# Patient Record
Sex: MALE | Race: WHITE | URBAN - METROPOLITAN AREA
[De-identification: names, ages, dates, MRNs, and addresses within clinical notes are randomized per-mention and may not be internally consistent; named-entity substitution may affect disease eponyms.]

---

## 2018-09-11 ENCOUNTER — THERAPY VISIT (OUTPATIENT)
Dept: PHYSICAL THERAPY | Facility: CLINIC | Age: 38
End: 2018-09-11
Payer: COMMERCIAL

## 2018-09-11 DIAGNOSIS — M25.572 PAIN IN JOINT INVOLVING ANKLE AND FOOT, LEFT: Primary | ICD-10-CM

## 2018-09-11 PROCEDURE — 97110 THERAPEUTIC EXERCISES: CPT | Mod: GP | Performed by: PHYSICAL THERAPIST

## 2018-09-11 PROCEDURE — 97161 PT EVAL LOW COMPLEX 20 MIN: CPT | Mod: GP | Performed by: PHYSICAL THERAPIST

## 2018-09-11 NOTE — PROGRESS NOTES
[facility] Initial Evaluation  Subjective:  Patient is a 38 year old male presenting with rehab left ankle/foot hpi. The history is provided by the patient.   Radames Lewis is a 38 year old male with a left foot (left navicular) condition.      This is a new condition  Patient has chief complaint of left foot pain with weight bearing and ankle weakness and stiffness s/p navicular ORIF on 6/29/2018. He injured foot while running 2 years ago and had surgery after trying to rest it with CAM boot without relief. Last Thursday he was given MD approval to walk and exercise with no restrictions. He has been using a rolling walker, but also has crutches at home. He gets lateral ankle and foot pain with weight bearing, but navicular area is pain free..    Patient reports pain:  Lateral (left lateral heel and ankle pain).  Radiates to:  Lower leg.  Pain is described as sharp and is intermittent and reported as 5/10.   Pain is the same all the time.  Symptoms are exacerbated by weight bearing, descending stairs, walking, ascending stairs and standing and relieved by rest.  Since onset symptoms are gradually improving.        General health as reported by patient is excellent.                                              Objective:    Gait:    Gait Type:  Antalgic   Weight Bearing Status:  WBAT   Assistive Devices:  Crutches            Ankle/Foot Evaluation  ROM:    AROM:    Dorsiflexion: Left:   3  Right:    Plantarflexion: Left:  35    Right:   Inversion: Left:  30     Right:   Eversion: 20     Right:   Great toe flexion: Left:  Stiff     Right:   Great Toe Extension: Left:  Stiff     Right:  PROM:    Dorsiflexion: Left:    6     Right:     Plantarflexion: Left:    50     Right:               Strength:    Dorsiflexion:  Left: 4/5     Pain:     Plantarflexion: Left: 2+/5   Pain:     Inversion:Left: 4-/5  Pain:       Eversion:Left: 4-/5  Pain:                    LIGAMENT TESTING: not  assessed                PALPATION:   Left ankle tenderness present at:  deltoid ligament and plantar fascia  Left ankle tenderness not present at:   achilles tendon or navicular    EDEMA:   Left ankle edema present at: general          MOBILITY TESTING:       Talocrural Left: hypomobile      Subtalar Left: hypomobile      Midtarsal Left: hypomobile      First Ray Left: hypomobile                                                        General     ROS    Assessment/Plan:    Patient is a 38 year old male with left foot complaints.    Patient has the following significant findings with corresponding treatment plan.                Diagnosis 1:  Left navicular ORIF  Pain -  hot/cold therapy and self management  Decreased ROM/flexibility - manual therapy, therapeutic exercise and home program  Decreased strength - therapeutic exercise, therapeutic activities and home program  Impaired gait - gait training    Therapy Evaluation Codes:   1) History comprised of:   Personal factors that impact the plan of care:      None.    Comorbidity factors that impact the plan of care are:      None.     Medications impacting care: None.  2) Examination of Body Systems comprised of:   Body structures and functions that impact the plan of care:      left foot.   Activity limitations that impact the plan of care are:      Sports, Stairs, Standing and Walking.  3) Clinical presentation characteristics are:   Stable/Uncomplicated.  4) Decision-Making    Low complexity using standardized patient assessment instrument and/or measureable assessment of functional outcome.  Cumulative Therapy Evaluation is: Low complexity.    Previous and current functional limitations:  (See Goal Flow Sheet for this information)    Short term and Long term goals: (See Goal Flow Sheet for this information)     Communication ability:  Patient appears to be able to clearly communicate and understand verbal and written communication and follow directions  correctly.  Treatment Explanation - The following has been discussed with the patient:   RX ordered/plan of care  Anticipated outcomes  Possible risks and side effects  This patient would benefit from PT intervention to resume normal activities.   Rehab potential is excellent.    Frequency:  1 X week, once daily  Duration:  for 6 weeks  Discharge Plan:  Achieve all LTG.  Independent in home treatment program.  Reach maximal therapeutic benefit.    Please refer to the daily flowsheet for treatment today, total treatment time and time spent performing 1:1 timed codes.

## 2018-09-11 NOTE — PROGRESS NOTES
[facility] Initial Evaluation  Subjective:  Patient is a 38 year old male presenting with rehab left ankle/foot hpi.                                          Other surgeries include:  Orthopedic surgery (foot/shoulder).    Current occupation is IT Analist.    Primary job tasks include:  Prolonged sitting (computer work).                                Objective:  System    Physical Exam    General     ROS    Assessment/Plan:

## 2018-09-11 NOTE — LETTER
[facility]  [address]  [facility] [address]  [phone]    2018    Re: Radames Lewis   :   1980  MRN:  [id number]   REFERRING PHYSICIAN:   Enzo Silverman    [facility] [place]    Date of Initial Evaluation:    Visits:  Rxs Used: 1  Reason for Referral:  Pain in joint involving ankle and foot, left    EVALUATION SUMMARY    [facility] Initial Evaluation  Subjective:  Patient is a 38 year old male presenting with rehab left ankle/foot hpi. The history is provided by the patient.   Radames Leiws is a 38 year old male with a left foot (left navicular) condition.      This is a new condition  Patient has chief complaint of left foot pain with weight bearing and ankle weakness and stiffness s/p navicular ORIF on 2018. He injured foot while running 2 years ago and had surgery after trying to rest it with CAM boot without relief. Last Thursday he was given MD approval to walk and exercise with no restrictions. He has been using a rolling walker, but also has crutches at home. He gets lateral ankle and foot pain with weight bearing, but navicular area is pain free..    Patient reports pain:  Lateral (left lateral heel and ankle pain).  Radiates to:  Lower leg.  Pain is described as sharp and is intermittent and reported as 5/10.   Pain is the same all the time.  Symptoms are exacerbated by weight bearing, descending stairs, walking, ascending stairs and standing and relieved by rest.  Since onset symptoms are gradually improving.        General health as reported by patient is excellent.                Other surgeries include:  Orthopedic surgery (foot/shoulder).    Current occupation is IT Analist.    Primary job tasks include:  Prolonged sitting (computer work).  Objective:  Gait:    Gait Type:  Antalgic   Weight Bearing Status:  WBAT   Assistive Devices:  Crutches  Ankle/Foot Evaluation  ROM:     AROM:    Dorsiflexion: Left:   3  Right:    Plantarflexion: Left:  35    Right:   Inversion: Left:  30     Right:   Eversion: 20     Right:   Great toe flexion: Left:  Stiff     Right:   Great Toe Extension: Left:  Stiff     Right:  PROM:    Dorsiflexion: Left:    6     Right:     Plantarflexion: Left:    50     Right:       Re: Radames Lewis   :   1980    Strength:    Dorsiflexion:  Left: 4/5     Pain:     Plantarflexion: Left: 2+/5   Pain:     Inversion:Left: 4-/5  Pain:       Eversion:Left: 4-/5  Pain:    LIGAMENT TESTING: not assessed  PALPATION:   Left ankle tenderness present at:  deltoid ligament and plantar fascia  Left ankle tenderness not present at:   achilles tendon or navicular  EDEMA:   Left ankle edema present at: general  MOBILITY TESTING:   Talocrural Left: hypomobile      Subtalar Left: hypomobile      Midtarsal Left: hypomobile      First Ray Left: hypomobile      Assessment/Plan:    Patient is a 38 year old male with left foot complaints.    Patient has the following significant findings with corresponding treatment plan.                Diagnosis 1:  Left navicular ORIF  Pain -  hot/cold therapy and self management  Decreased ROM/flexibility - manual therapy, therapeutic exercise and home program  Decreased strength - therapeutic exercise, therapeutic activities and home program  Impaired gait - gait training    Therapy Evaluation Codes:   1) History comprised of:   Personal factors that impact the plan of care:      None.    Comorbidity factors that impact the plan of care are:      None.     Medications impacting care: None.  2) Examination of Body Systems comprised of:   Body structures and functions that impact the plan of care:      left foot.   Activity limitations that impact the plan of care are:      Sports, Stairs, Standing and Walking.  3) Clinical presentation characteristics are:   Stable/Uncomplicated.  4) Decision-Making    Low complexity using standardized patient  assessment instrument and/or measureable assessment of functional outcome.  Cumulative Therapy Evaluation is: Low complexity.    Previous and current functional limitations:  (See Goal Flow Sheet for this information)    Short term and Long term goals: (See Goal Flow Sheet for this information)     Communication ability:  Patient appears to be able to clearly communicate and understand verbal and written communication and follow directions correctly.  Treatment Explanation - The following has been discussed with the patient:   RX ordered/plan of care  Re: Radames Lewis   :   1980          Anticipated outcomes  Possible risks and side effects  This patient would benefit from PT intervention to resume normal activities.   Rehab potential is excellent.    Frequency:  1 X week, once daily  Duration:  for 6 weeks  Discharge Plan:  Achieve all LTG.  Independent in home treatment program.  Reach maximal therapeutic benefit.      Thank you for your referral.    INQUIRIES  Therapist:  Ivett Arroyo, PT  [facility] [place]  [address] [facility] [address]  Phone: [phone]  Fax: [phone]

## 2018-09-12 PROBLEM — M25.572 PAIN IN JOINT INVOLVING ANKLE AND FOOT, LEFT: Status: ACTIVE | Noted: 2018-09-12

## 2018-10-11 PROBLEM — M25.572 PAIN IN JOINT INVOLVING ANKLE AND FOOT, LEFT: Status: RESOLVED | Noted: 2018-09-12 | Resolved: 2018-10-11

## 2018-12-04 ENCOUNTER — THERAPY VISIT (OUTPATIENT)
Dept: PHYSICAL THERAPY | Facility: CLINIC | Age: 38
End: 2018-12-04
Payer: COMMERCIAL

## 2018-12-04 DIAGNOSIS — M25.511 RIGHT SHOULDER PAIN: Primary | ICD-10-CM

## 2018-12-04 PROCEDURE — 97161 PT EVAL LOW COMPLEX 20 MIN: CPT | Mod: GP | Performed by: PHYSICAL THERAPIST

## 2018-12-04 PROCEDURE — 97110 THERAPEUTIC EXERCISES: CPT | Mod: GP | Performed by: PHYSICAL THERAPIST

## 2018-12-04 NOTE — PROGRESS NOTES
[facility] Initial Evaluation  Subjective:  Patient is a 38 year old male presenting with rehab right shoulder hpi. The history is provided by the patient.   Radames Lewis is a 38 year old male with a right shoulder condition.        Patient underwent right shoulder arthroscopic bicep tenodesis with debridement on 11/27/2018. Pain level is 4/10 and 010 at rest. He states that his shoulder has bothered him for many years, but went to see his MD 3-4 months ago due to gradual increase in pain. He had increased pain with reaching overhead, external rotation, lying on it, and throwing. No complaints of instability and minimal weakness prior to surgery. History of two labral surgeries 5 & 10 years ago, with minimal relief of shoulder pain. Patient is right handed.    Patient reports pain:  Anterior, in the joint and upper arm.    Pain is described as aching and sharp and is intermittent and reported as 4/10.  Associated symptoms:  Loss of motion/stiffness. Pain is the same all the time.  Symptoms are exacerbated by certain positions, lifting, lying on extremity, using arm overhead and using arm at shoulder level and relieved by rest and ice.  Since onset symptoms are gradually improving.        General health as reported by patient is good.                                              Objective:  System                   Shoulder Evaluation:  ROM:  AROM:  not assessed                            PROM:    Flexion:  Right: 80    Extension:  Right:  25  Abduction:  Right:  60    Internal Rotation:  Right:  65  External Rotation:  Right:  25      Elbow Flexion:  Right:  WNL  Elbow Extension:  Right:  WNL            Strength:  not assessed                                 ROM:    PROM:          Flexion Wrist:  Right:  WNL  Extension Wrist:  Right: 50  Supination Elbow/Wrist: Right: 60  Pronation Elbow/Wrist: Right: WNL  Radial Deviation:  Right: WNL  Ulnar Deviation: Right: WNL                                             General     ROS    Assessment/Plan:    Patient is a 38 year old male with right side shoulder complaints.    Patient has the following significant findings with corresponding treatment plan.                Diagnosis 1:  Right shoulder biceps tenodesis  Pain -  hot/cold therapy, manual therapy and self management  Decreased ROM/flexibility - manual therapy, therapeutic exercise and home program  Decreased strength - therapeutic exercise, therapeutic activities and home program    Therapy Evaluation Codes:   1) History comprised of:   Personal factors that impact the plan of care:      None.    Comorbidity factors that impact the plan of care are:      None.     Medications impacting care: None.  2) Examination of Body Systems comprised of:   Body structures and functions that impact the plan of care:      Shoulder.   Activity limitations that impact the plan of care are:      Bathing, Dressing and Lifting.  3) Clinical presentation characteristics are:   Stable/Uncomplicated.  4) Decision-Making    Low complexity using standardized patient assessment instrument and/or measureable assessment of functional outcome.  Cumulative Therapy Evaluation is: Low complexity.    Previous and current functional limitations:  (See Goal Flow Sheet for this information)    Short term and Long term goals: (See Goal Flow Sheet for this information)     Communication ability:  Patient appears to be able to clearly communicate and understand verbal and written communication and follow directions correctly.  Treatment Explanation - The following has been discussed with the patient:   RX ordered/plan of care  Anticipated outcomes  Possible risks and side effects  This patient would benefit from PT intervention to resume normal activities.   Rehab potential is excellent.    Frequency:  2 X week, once daily  Duration:  for 4 weeks  Discharge Plan:  Achieve all LTG.  Independent in home treatment program.  Reach maximal  therapeutic benefit.    Please refer to the daily flowsheet for treatment today, total treatment time and time spent performing 1:1 timed codes.

## 2018-12-04 NOTE — LETTER
[facility]  [address]  [facility] [address]  [phone]    2018    Re: Radames Lewis   :   1980  MRN:  [id number]   REFERRING PHYSICIAN:   Zhang Moore    [facility]  Date of Initial Evaluation:  18  Visits:  Rxs Used: 1  Reason for Referral:  Right shoulder pain    EVALUATION SUMMARY  [facility] Initial Evaluation  Subjective:  Patient is a 38 year old male presenting with rehab right shoulder hpi. The history is provided by the patient. Radames Lewis is a 38 year old male with a right shoulder condition.  Patient underwent right shoulder arthroscopic bicep tenodesis with debridement on 2018. Pain level is 4/10 and 010 at rest. He states that his shoulder has bothered him for many years, but went to see his MD 3-4 months ago due to gradual increase in pain. He had increased pain with reaching overhead, external rotation, lying on it, and throwing. No complaints of instability and minimal weakness prior to surgery. History of two labral surgeries 5 & 10 years ago, with minimal relief of shoulder pain. Patient is right handed.  Patient reports pain:  Anterior, in the joint and upper arm.    Pain is described as aching and sharp and is intermittent and reported as 4/10.  Associated symptoms:  Loss of motion/stiffness. Pain is the same all the time.  Symptoms are exacerbated by certain positions, lifting, lying on extremity, using arm overhead and using arm at shoulder level and relieved by rest and ice.  Since onset symptoms are gradually improving.        General health as reported by patient is good.                     Shoulder Evaluation:  ROM:  AROM:  not assessed  PROM:    Flexion:  Right: 80    Extension:  Right:  25  Abduction:  Right:  60  Internal Rotation:  Right:  65  External Rotation:  Right:  25  Elbow Flexion:  Right:  WNL  Elbow Extension:  Right:  WNL  Strength:  not  assessed  ROM:  PROM:    Flexion Wrist:  Right:  WNL  Extension Wrist:  Right: 50  Supination Elbow/Wrist: Right: 60  Pronation Elbow/Wrist: Right: WNL  Radial Deviation:  Right: WNL  Ulnar Deviation: Right: WNL          Re: Radames Lewis   :   1980    Assessment/Plan:    Patient is a 38 year old male with right side shoulder complaints.    Patient has the following significant findings with corresponding treatment plan.                Diagnosis 1:  Right shoulder biceps tenodesis  Pain -  hot/cold therapy, manual therapy and self management  Decreased ROM/flexibility - manual therapy, therapeutic exercise and home program  Decreased strength - therapeutic exercise, therapeutic activities and home program    Therapy Evaluation Codes:   1) History comprised of:   Personal factors that impact the plan of care:      None.    Comorbidity factors that impact the plan of care are:      None.     Medications impacting care: None.  2) Examination of Body Systems comprised of:   Body structures and functions that impact the plan of care:      Shoulder.   Activity limitations that impact the plan of care are:      Bathing, Dressing and Lifting.  3) Clinical presentation characteristics are:   Stable/Uncomplicated.  4) Decision-Making    Low complexity using standardized patient assessment instrument and/or measureable assessment of functional outcome.  Cumulative Therapy Evaluation is: Low complexity.    Previous and current functional limitations:  (See Goal Flow Sheet for this information)    Short term and Long term goals: (See Goal Flow Sheet for this information)     Communication ability:  Patient appears to be able to clearly communicate and understand verbal and written communication and follow directions correctly.  Treatment Explanation - The following has been discussed with the patient:   RX ordered/plan of care  Anticipated outcomes  Possible risks and side effects  This patient would benefit from PT  intervention to resume normal activities.   Rehab potential is excellent.    Frequency:  2 X week, once daily  Duration:  for 4 weeks  Discharge Plan:  Achieve all LTG.  Independent in home treatment program.  Reach maximal therapeutic benefit.    Thank you for your referral.    INQUIRIES  Therapist: Sandrine Arroyo [facility] [place]  [address] [facility] [address]  Phone: [phone]  Fax: [phone]

## 2018-12-07 ENCOUNTER — THERAPY VISIT (OUTPATIENT)
Dept: PHYSICAL THERAPY | Facility: CLINIC | Age: 38
End: 2018-12-07
Payer: COMMERCIAL

## 2018-12-07 DIAGNOSIS — M25.511 RIGHT SHOULDER PAIN: ICD-10-CM

## 2018-12-07 PROCEDURE — 97010 HOT OR COLD PACKS THERAPY: CPT | Mod: GP

## 2018-12-07 PROCEDURE — 97110 THERAPEUTIC EXERCISES: CPT | Mod: GP

## 2018-12-18 ENCOUNTER — THERAPY VISIT (OUTPATIENT)
Dept: PHYSICAL THERAPY | Facility: CLINIC | Age: 38
End: 2018-12-18
Payer: COMMERCIAL

## 2018-12-18 DIAGNOSIS — M25.511 RIGHT SHOULDER PAIN: ICD-10-CM

## 2018-12-18 PROCEDURE — 97110 THERAPEUTIC EXERCISES: CPT | Mod: GP | Performed by: PHYSICAL THERAPIST

## 2018-12-18 NOTE — LETTER
[facility]  [address] [facility] [id number] [phone]    2019    Re: Radames Lewis   :   1980  MRN:  [id number]   REFERRING PHYSICIAN:   Zhang Moore    [facility]  Date of Initial Evaluation:  2018  Visits:  Rxs Used: 3  Reason for Referral:  Right shoulder pain     DISCHARGE REPORT  Progress reporting period is from 2018 to 2018.   SUBJECTIVE  Subjective: Patient reports shoulder is doing well and is no longer wearing sling. He finds that he has to constantly be aware not to use it too much.   Current Pain level: 2/10   Initial Pain level: 10   Changes in function: Yes, see goal flow sheet for change in function   Adverse reactions: None    OBJECTIVE  Objective: AAROM: IR=75; ER=50;[id number]. Elbow ROM is full and pain free.     ASSESSMENT/PLAN  STG/LTGs have been met or progress has been made towards goals:  Yes (See Goal flow sheet completed today.)  Assessment of Progress: The patient's condition is improving.  The patient's condition has potential to improve.  Self Management Plans:  Patient is independent in a home treatment program.  Patient is independent in self management of symptoms.  Radames continues to require the following intervention to meet STG and LTG's: PT intervention is no longer required to meet STG/LTG.    Recommendations:  This patient is ready to be discharged from therapy and continue their home treatment program.    Thank you for your referral.    INQUIRIES  Therapist: Ivett Arroyo, PT  [facility]  [address] [address]  Phone: [phone]  Fax: [phone]

## 2019-03-19 PROBLEM — M25.511 RIGHT SHOULDER PAIN: Status: RESOLVED | Noted: 2018-12-04 | Resolved: 2019-03-19

## 2019-03-19 NOTE — PROGRESS NOTES
Subjective:  HPI                    Objective:  System    Physical Exam    General     ROS    Assessment/Plan:    DISCHARGE REPORT    Progress reporting period is from 12/4/2018 to 12/18/2018.     SUBJECTIVE    Subjective: Patient reports shoulder is doing well and is no longer wearing sling. He finds that he has to constantly be aware not to use it too much.   Current Pain level: 2/10   Initial Pain level: 4/10   Changes in function: Yes, see goal flow sheet for change in function   Adverse reactions: None    OBJECTIVE    Objective: AAROM: IR=75; ER=50;[id number]. Elbow ROM is full and pain free.      ASSESSMENT/PLAN    STG/LTGs have been met or progress has been made towards goals:  Yes (See Goal flow sheet completed today.)  Assessment of Progress: The patient's condition is improving.  The patient's condition has potential to improve.  Self Management Plans:  Patient is independent in a home treatment program.  Patient is independent in self management of symptoms.    Radames continues to require the following intervention to meet STG and LTG's: PT intervention is no longer required to meet STG/LTG.    Recommendations:    This patient is ready to be discharged from therapy and continue their home treatment program.    Please refer to the daily flowsheet for treatment today, total treatment time and time spent performing 1:1 timed codes.

## 2022-06-14 ENCOUNTER — NURSE TRIAGE (OUTPATIENT)
Dept: NURSING | Facility: CLINIC | Age: 42
End: 2022-06-14
Payer: COMMERCIAL

## 2022-06-14 NOTE — TELEPHONE ENCOUNTER
Pt calling wanting a provider to send an RX for cough with codeine into pharmacy for him.    Pt states he just came home from traveling to [place] for work and he doesn't feel like going out to an [facility] tonight.    Pt reports that he has a dry cough but worst symptom is sore throat that feels like razor blades.  Voice sounds different to him    Pt Denies;  Fever  Difficulty breathing/shortness of breath  Unable to open his mouth completely    According to the protocol, patient should see a physician or HCP within 24 hours.  Care advice given. Patient verbalizes understanding and agrees with plan of care. Transferred to scheduling. Patient disconnected the call during transfer.    Nicole Dugan RN, Nurse Advisor 6:17 PM 6/14/2022  COVID 19 Nurse Triage Plan/Patient Instructions    Please be aware that novel coronavirus (COVID-19) may be circulating in the community. If you develop symptoms such as fever, cough, or SOB or if you have concerns about the presence of another infection including coronavirus (COVID-19), please contact your health care provider or visit [email or web address].     Disposition/Instructions    In-Person Visit with provider recommended. Reference Visit Selection Guide.    Thank you for taking steps to prevent the spread of this virus.  o Limit your contact with others.  o Wear a simple mask to cover your cough.  o Wash your hands well and often.    Reason for Disposition    SEVERE (e.g., excruciating) throat pain    Additional Information    Negative: Severe difficulty breathing (e.g., struggling for each breath, speaks in single words, stridor)    Negative: Sounds like a life-threatening emergency to the triager    Negative: [1] Diagnosed strep throat AND [2] taking antibiotic AND [3] symptoms continue    Negative: Throat culture results, call about    Negative: Productive cough is main symptom    Negative: Non-productive cough is main symptom    Negative: Hoarseness is main symptom     Negative: Runny nose is main symptom    Negative: [1] Drooling or spitting out saliva (because can't swallow) AND [2] normal breathing    Negative: Unable to open mouth completely    Negative: [1] Difficulty breathing AND [2] not severe    Negative: Fever > 104 F (40 C)    Negative: [1] Refuses to drink anything AND [2] for > 12 hours    Negative: [1] Drinking very little AND [2] dehydration suspected (e.g., no urine > 12 hours, very dry mouth, very lightheaded)    Negative: Patient sounds very sick or weak to the triager    Protocols used: SORE THROAT-A-AH

## 2025-03-20 ENCOUNTER — OFFICE VISIT (OUTPATIENT)
Dept: AUDIOLOGY | Facility: CLINIC | Age: 45
End: 2025-03-20
Payer: COMMERCIAL

## 2025-03-20 DIAGNOSIS — H93.12 TINNITUS OF LEFT EAR: ICD-10-CM

## 2025-03-20 DIAGNOSIS — H90.42 SENSORINEURAL HEARING LOSS (SNHL) OF LEFT EAR WITH UNRESTRICTED HEARING OF RIGHT EAR: Primary | ICD-10-CM

## 2025-03-20 NOTE — TELEPHONE ENCOUNTER
REFERRAL INFORMATION:  Referring By: Lu Kenyon AuD   Referring Clinic: [facility]   Reason for Visit/Diagnosis:  ENT for unilateral hearing loss and tinnitus    FUTURE VISIT INFORMATION:  Appointment Date: 4/21/25  Appointment Time: 1:50PM    NOTES STATUS DETAILS   OFFICE NOTE from referring provider Internal  3/20/25- OV wLu Salinas AuD @ [facility]

## 2025-03-20 NOTE — PROGRESS NOTES
"AUDIOLOGY REPORT    SUBJECTIVE:  Radames Lewis is a 44 year old male who was seen in the Audiology Clinic at the [facility] for audiologic evaluation. The patient reports a significant noise exposure event while hunting when he was 14 years old, he notes that following the initial noise exposure he had significant left tinnitus and decreased hearing which seems to have recovered. He reports that he generally has constant low level tinnitus that is not bothersome but that he will always experience loud tinnitus after being in any type of noisy environment. He reports that he has also begun experiencing a \"throbbing pain\" in the ears along with the tinnitus following being in noise. He denies any ear surgeries. The patient denies  bilateral drainage, bilateral aural fullness, and dizziness.  The patient notes difficulty with communication in a variety of listening situations, mostly complex environments. He also reports that he feels he speaks loudly/has difficulty regulating the volume of his voice.    OBJECTIVE:    Fall Risk Screen:  1. Have you fallen two or more times in the past year? No  2. Have you fallen and had an injury in the past year? No    Timed Up and Go Score (in seconds): not tested  Is patient a fall risk? No  Referral initiated: No  Fall Risk Assessment Completed by Audiology    Otoscopic exam indicates ears are clear of cerumen bilaterally     Pure Tone Thresholds assessed using conventional audiometry with good  reliability from 250-8000 Hz bilaterally using insert earphones and circumaural headphones     RIGHT:  Normal hearing sensitivity    LEFT:    normal through 4 kHz sloping to severe sensorineural hearing loss    Tympanogram:    RIGHT: normal eardrum mobility    LEFT:   normal eardrum mobility    Reflexes (reported by stimulus ear):  RIGHT: Ipsilateral is present at normal levels  RIGHT: Contralateral is present at normal levels  LEFT:   " Ipsilateral is present at normal levels  LEFT:   Contralateral is present at normal levels    Speech Reception Threshold:    RIGHT: 5 dB HL    LEFT:   10 dB HL    Word Recognition Score:     RIGHT: 100% at 50 dB HL using NU-6 recorded word list.    LEFT:   100% at 50 dB HL using NU-6 recorded word list.      ASSESSMENT:    Today's results revealed a unilateral left sensorineural hearing loss. Today s results were discussed with the patient in detail.  It was reviewed with the patient that while there is a hearing loss, it is in the high frequencies and so a hearing aid may not be beneficial due to limits of equipment at this time. It was reviewed that the option of a hearing aid could be pursued to be used as a masker. Because he can generally ignore the tinnitus, he did not want to pursue that option at this time but was in agreement to monitor the hearing again in a year.    The patient reports that he has never been seen by an ENT. While the case history suggests that the hearing loss is caused by noise, the pattern of the hearing loss does not follow the typical noise exposure notch, an appointment with ENT was offered. The patient was interested in pursuing that appointment just to make sure that the left ear is OK from the medical standpoint.    PLAN:  Patient was counseled regarding hearing loss and impact on communication. It is recommended that the patient repeat the hearing evaluation in 1 year, sooner if concerns arise. He is welcome to return to discuss a hearing aid should he desire to use an on-ear masker. The appointment with ENT was scheduled prior to him leaving clinic today.  Please call this clinic with questions regarding these results or recommendations.        Lu Kenyon, Bambi  Audiologist  [place] License  #[id number]

## 2025-04-21 ENCOUNTER — OFFICE VISIT (OUTPATIENT)
Dept: OTOLARYNGOLOGY | Facility: CLINIC | Age: 45
End: 2025-04-21
Payer: COMMERCIAL

## 2025-04-21 ENCOUNTER — PRE VISIT (OUTPATIENT)
Dept: OTOLARYNGOLOGY | Facility: CLINIC | Age: 45
End: 2025-04-21

## 2025-04-21 VITALS
HEIGHT: 76 IN | WEIGHT: 181.6 LBS | DIASTOLIC BLOOD PRESSURE: 97 MMHG | OXYGEN SATURATION: 100 % | HEART RATE: 82 BPM | BODY MASS INDEX: 22.11 KG/M2 | SYSTOLIC BLOOD PRESSURE: 143 MMHG

## 2025-04-21 DIAGNOSIS — H90.3 ASYMMETRICAL SENSORINEURAL HEARING LOSS: Primary | ICD-10-CM

## 2025-04-21 RX ORDER — LAMOTRIGINE 100 MG/1
1 TABLET ORAL DAILY
COMMUNITY
Start: 2025-03-31

## 2025-04-21 ASSESSMENT — PAIN SCALES - GENERAL: PAINLEVEL_OUTOF10: NO PAIN (0)

## 2025-04-21 NOTE — LETTER
4/21/2025       RE: Radames Lewis  [address] [address]     Dear Colleague,    Thank you for referring your patient, Radames Lewis, to the [facility] [place] [facility]. Please see a copy of my visit note below.      Otolaryngology Clinic  April 21, 2025    Chief Complaint:   Unilateral tinnitus, hearing loss and hyperacusis       History of Present Illness:   Radames Lewis is a 44 year old male who presents today for evaluation of unilateral tinnitus, hearing loss and hyperacusis.  Patient reports a history of significant noise trauma when they were 14 hunting with multiple rounds of firing the rifle.  After that event, patient had significant hearing loss and tinnitus particularly in the left ear.  Overall, symptoms improved over a month or so but left ear remained more plugged with tinnitus.  Patient is now used to left tinnitus symptoms.  Patient does report episodes when they are exposed to very loud noises that cause a significant increase in tinnitus and deep ear pain that will last a couple of days.  These events only occur with very loud noises and patient has not had an episode like this for quite a while.  When patient is exposed to loud noises, they deny any vertigo.    Today, patient denies any otalgia, otorrhea, dizziness, facial numbness/weakness, history of frequent ear infections, or ear surgeries. Baseline left tinnitus and hearing loss.    Past Medical History:  Past Medical History:   Diagnosis Date     NO ACTIVE PROBLEMS      Past Surgical History:  Past Surgical History:   Procedure Laterality Date     ARTHROSCOPY SHOULDER RT/LT  2007    right labrum tear       Medications:  Current Outpatient Medications   Medication Sig Dispense Refill     LISINOPRIL PO Take 30 mg by mouth daily         Allergies:  Allergies   Allergen Reactions     Atenolol Other (See Comments)     BP drops very  low     Vicodin [Acetaminophen] Itching        Social History:  Social History     Tobacco Use     Smoking status: Never     Smokeless tobacco: Never   Substance Use Topics     Alcohol use: Yes     Drug use: No       ROS: 10 point ROS neg other than the symptoms noted above in the HPI.    Physical Exam:    There were no vitals taken for this visit.     Constitutional:  The patient was unaccompanied, well-groomed, and in no acute distress.     Skin: Normal:  warm and pink without rash    Neurologic: Alert and oriented x 3.  CN's III-XII within normal limits.  Voice normal.    Psychiatric: The patient's affect was calm, cooperative, and appropriate.     Communication:  Normal; communicates verbally, normal voice quality.    Respiratory: Breathing comfortably without stridor or exertion of accessory muscles.    Ears: Pinnae and tragus non-tender.  EAC's and TM's were clear.        Otologic microscope exam:    Right ear was examined under the microscope.  Normal appearing TM, nicely aerated middle ear space.  Left ear was also examined under the microscope.  Normal appearing TM, nicely aerated middle ear space.        Audiogram: 3/20/2025 - data independently reviewed  Right ear: Normal hearing   Left ear: Normal steeply sloping to severe sensorineural hearing loss at 6000 & 8000 Hz.   % at 50 dB bilaterally   Acoustic Reflexes: Present in all conditions  Tympanograms: type A bilaterally      Assessment and Plan:  1. Asymmetrical sensorineural hearing loss (Primary)  Asymmetric left sensorineural hearing loss, tinnitus and hyperacusis.  Reviewed audiogram with patient today.  Left hearing loss is consistent with noise trauma.  Did discuss obtaining an MRI to rule out a retrocochlear lesion.  Patient agrees that hearing loss is likely due to to their previous noise exposure.    Regarding ear pain, suspect that this is hyperacusis.  It is a little uncommon for pain to last a couple of days but reassuring that pain  eventually improves.  There is no signs of any infection on exam today.  Your exam is normal.    Recommend that patient continue to monitor at this time and continue to protect ears when around loud noises.  Patient will return to clinic in 1 to 2 years with repeat audiogram.  Sooner for any new, sudden changes in hearing or worsening of tinnitus symptoms.     Kayce Florentino DNP, APRN, CNP  Otolaryngology  Head & Neck Surgery  [phone]    30 minutes spent by me on the date of the encounter doing chart review, history and exam, documentation and further activities per the note      Again, thank you for allowing me to participate in the care of your patient.      Sincerely,    Lilia Florentino, NP

## 2025-04-21 NOTE — PROGRESS NOTES
Otolaryngology Clinic  April 21, 2025    Chief Complaint:   Unilateral tinnitus, hearing loss and hyperacusis       History of Present Illness:   Radames Lewis is a 44 year old male who presents today for evaluation of unilateral tinnitus, hearing loss and hyperacusis.  Patient reports a history of significant noise trauma when they were 14 hunting with multiple rounds of firing the rifle.  After that event, patient had significant hearing loss and tinnitus particularly in the left ear.  Overall, symptoms improved over a month or so but left ear remained more plugged with tinnitus.  Patient is now used to left tinnitus symptoms.  Patient does report episodes when they are exposed to very loud noises that cause a significant increase in tinnitus and deep ear pain that will last a couple of days.  These events only occur with very loud noises and patient has not had an episode like this for quite a while.  When patient is exposed to loud noises, they deny any vertigo.    Today, patient denies any otalgia, otorrhea, dizziness, facial numbness/weakness, history of frequent ear infections, or ear surgeries. Baseline left tinnitus and hearing loss.    Past Medical History:  Past Medical History:   Diagnosis Date    NO ACTIVE PROBLEMS      Past Surgical History:  Past Surgical History:   Procedure Laterality Date    ARTHROSCOPY SHOULDER RT/LT  2007    right labrum tear       Medications:  Current Outpatient Medications   Medication Sig Dispense Refill    LISINOPRIL PO Take 30 mg by mouth daily         Allergies:  Allergies   Allergen Reactions    Atenolol Other (See Comments)     BP drops very low    Vicodin [Acetaminophen] Itching        Social History:  Social History     Tobacco Use    Smoking status: Never    Smokeless tobacco: Never   Substance Use Topics    Alcohol use: Yes    Drug use: No       ROS: 10 point ROS neg other than the symptoms noted above in the HPI.    Physical Exam:    There were no vitals taken  for this visit.     Constitutional:  The patient was unaccompanied, well-groomed, and in no acute distress.     Skin: Normal:  warm and pink without rash    Neurologic: Alert and oriented x 3.  CN's III-XII within normal limits.  Voice normal.    Psychiatric: The patient's affect was calm, cooperative, and appropriate.     Communication:  Normal; communicates verbally, normal voice quality.    Respiratory: Breathing comfortably without stridor or exertion of accessory muscles.    Ears: Pinnae and tragus non-tender.  EAC's and TM's were clear.        Otologic microscope exam:    Right ear was examined under the microscope.  Normal appearing TM, nicely aerated middle ear space.  Left ear was also examined under the microscope.  Normal appearing TM, nicely aerated middle ear space.        Audiogram: 3/20/2025 - data independently reviewed  Right ear: Normal hearing   Left ear: Normal steeply sloping to severe sensorineural hearing loss at 6000 & 8000 Hz.   % at 50 dB bilaterally   Acoustic Reflexes: Present in all conditions  Tympanograms: type A bilaterally      Assessment and Plan:  1. Asymmetrical sensorineural hearing loss (Primary)  Asymmetric left sensorineural hearing loss, tinnitus and hyperacusis.  Reviewed audiogram with patient today.  Left hearing loss is consistent with noise trauma.  Did discuss obtaining an MRI to rule out a retrocochlear lesion.  Patient agrees that hearing loss is likely due to to their previous noise exposure.    Regarding ear pain, suspect that this is hyperacusis.  It is a little uncommon for pain to last a couple of days but reassuring that pain eventually improves.  There is no signs of any infection on exam today.  Your exam is normal.    Recommend that patient continue to monitor at this time and continue to protect ears when around loud noises.  Patient will return to clinic in 1 to 2 years with repeat audiogram.  Sooner for any new, sudden changes in hearing or worsening  of tinnitus symptoms.     Kayce Florentino DNP, APRN, CNP  Otolaryngology  Head & Neck Surgery  [phone]    30 minutes spent by me on the date of the encounter doing chart review, history and exam, documentation and further activities per the note